# Patient Record
Sex: MALE | Race: WHITE | Employment: OTHER | URBAN - METROPOLITAN AREA
[De-identification: names, ages, dates, MRNs, and addresses within clinical notes are randomized per-mention and may not be internally consistent; named-entity substitution may affect disease eponyms.]

---

## 2018-04-09 ENCOUNTER — OFFICE VISIT (OUTPATIENT)
Dept: ONCOLOGY | Age: 78
End: 2018-04-09
Payer: MEDICARE

## 2018-04-09 ENCOUNTER — HOSPITAL ENCOUNTER (OUTPATIENT)
Dept: INFUSION THERAPY | Age: 78
Discharge: HOME | End: 2018-04-09
Payer: MEDICARE

## 2018-04-09 VITALS
DIASTOLIC BLOOD PRESSURE: 82 MMHG | HEART RATE: 66 BPM | TEMPERATURE: 98.2 F | RESPIRATION RATE: 16 BRPM | WEIGHT: 197 LBS | SYSTOLIC BLOOD PRESSURE: 145 MMHG | BODY MASS INDEX: 28.2 KG/M2 | HEIGHT: 70 IN | OXYGEN SATURATION: 96 %

## 2018-04-09 DIAGNOSIS — D64.9 ANEMIA, UNSPECIFIED TYPE: Primary | ICD-10-CM

## 2018-04-09 DIAGNOSIS — D64.9 ANEMIA, UNSPECIFIED TYPE: ICD-10-CM

## 2018-04-09 LAB
ALBUMIN SERPL-MCNC: 4.3 G/DL (ref 3.5–5.1)
ALP BLD-CCNC: 55 U/L (ref 38–126)
ALT SERPL-CCNC: 13 U/L (ref 11–66)
AST SERPL-CCNC: 17 U/L (ref 5–40)
BASINOPHIL, AUTOMATED: 0 % (ref 0–3)
BILIRUB SERPL-MCNC: 0.5 MG/DL (ref 0.3–1.2)
BILIRUBIN DIRECT: < 0.2 MG/DL (ref 0–0.3)
BUN, WHOLE BLOOD: 17 MG/DL (ref 8–26)
CHLORIDE, WHOLE BLOOD: 106 MEQ/L (ref 98–109)
CREATININE, WHOLE BLOOD: 0.8 MG/DL (ref 0.5–1.2)
EOSINOPHILS RELATIVE PERCENT: 3 % (ref 0–4)
FERRITIN: 12 NG/ML (ref 22–322)
FOLATE: 8.7 NG/ML (ref 4.8–24.2)
GFR, ESTIMATED: > 90 ML/MIN/1.73M2
GLUCOSE, WHOLE BLOOD: 98 MG/DL (ref 70–108)
HCT VFR BLD CALC: 30.4 % (ref 42–52)
HEMOGLOBIN: 10 GM/DL (ref 14–18)
IONIZED CALCIUM, WHOLE BLOOD: 1.22 MMOL/L (ref 1.12–1.32)
IRON SATURATION: 13 % (ref 20–50)
IRON: 51 UG/DL (ref 65–195)
LD: 152 U/L (ref 100–190)
LYMPHOCYTES # BLD: 32 % (ref 15–47)
MCH RBC QN AUTO: 28.7 PG (ref 27–31)
MCHC RBC AUTO-ENTMCNC: 32.8 GM/DL (ref 33–37)
MCV RBC AUTO: 87 FL (ref 80–94)
MONOCYTES: 6 % (ref 0–12)
PDW BLD-RTO: 13.8 % (ref 11.5–14.5)
PLATELET # BLD: 217 THOU/MM3 (ref 130–400)
PMV BLD AUTO: 6.9 FL (ref 7.4–10.4)
POTASSIUM, WHOLE BLOOD: 4.6 MEQ/L (ref 3.5–4.9)
RBC # BLD: 3.47 MILL/MM3 (ref 4.7–6.1)
SEG NEUTROPHILS: 59 % (ref 43–75)
SODIUM, WHOLE BLOOD: 141 MEQ/L (ref 138–146)
TOTAL CO2, WHOLE BLOOD: 24 MEQ/L (ref 23–33)
TOTAL IRON BINDING CAPACITY: 400 UG/DL (ref 171–450)
TOTAL PROTEIN: 8.6 G/DL (ref 6.1–8)
VITAMIN B-12: 1015 PG/ML (ref 211–911)
WBC # BLD: 4.2 THOU/MM3 (ref 4.8–10.8)

## 2018-04-09 PROCEDURE — 83540 ASSAY OF IRON: CPT

## 2018-04-09 PROCEDURE — 36415 COLL VENOUS BLD VENIPUNCTURE: CPT

## 2018-04-09 PROCEDURE — G8419 CALC BMI OUT NRM PARAM NOF/U: HCPCS | Performed by: INTERNAL MEDICINE

## 2018-04-09 PROCEDURE — 84160 ASSAY OF PROTEIN ANY SOURCE: CPT

## 2018-04-09 PROCEDURE — 82607 VITAMIN B-12: CPT

## 2018-04-09 PROCEDURE — 83615 LACTATE (LD) (LDH) ENZYME: CPT

## 2018-04-09 PROCEDURE — 99211 OFF/OP EST MAY X REQ PHY/QHP: CPT

## 2018-04-09 PROCEDURE — 82746 ASSAY OF FOLIC ACID SERUM: CPT

## 2018-04-09 PROCEDURE — 1123F ACP DISCUSS/DSCN MKR DOCD: CPT | Performed by: INTERNAL MEDICINE

## 2018-04-09 PROCEDURE — 1036F TOBACCO NON-USER: CPT | Performed by: INTERNAL MEDICINE

## 2018-04-09 PROCEDURE — 4040F PNEUMOC VAC/ADMIN/RCVD: CPT | Performed by: INTERNAL MEDICINE

## 2018-04-09 PROCEDURE — 84165 PROTEIN E-PHORESIS SERUM: CPT

## 2018-04-09 PROCEDURE — 82728 ASSAY OF FERRITIN: CPT

## 2018-04-09 PROCEDURE — 85025 COMPLETE CBC W/AUTO DIFF WBC: CPT

## 2018-04-09 PROCEDURE — 83550 IRON BINDING TEST: CPT

## 2018-04-09 PROCEDURE — 80076 HEPATIC FUNCTION PANEL: CPT

## 2018-04-09 PROCEDURE — 99203 OFFICE O/P NEW LOW 30 MIN: CPT | Performed by: INTERNAL MEDICINE

## 2018-04-09 PROCEDURE — 80047 BASIC METABLC PNL IONIZED CA: CPT

## 2018-04-09 PROCEDURE — G8428 CUR MEDS NOT DOCUMENT: HCPCS | Performed by: INTERNAL MEDICINE

## 2018-04-09 RX ORDER — LISINOPRIL 40 MG/1
TABLET ORAL
COMMUNITY
Start: 2018-04-02

## 2018-04-09 RX ORDER — SIMVASTATIN 40 MG
40 TABLET ORAL
COMMUNITY
Start: 2017-08-07

## 2018-04-09 RX ORDER — LEVOTHYROXINE SODIUM 0.07 MG/1
TABLET ORAL
COMMUNITY
Start: 2018-01-19

## 2018-04-09 RX ORDER — CYCLOBENZAPRINE HCL 10 MG
10 TABLET ORAL
COMMUNITY

## 2018-04-09 RX ORDER — PANTOPRAZOLE SODIUM 40 MG/1
40 TABLET, DELAYED RELEASE ORAL
COMMUNITY
Start: 2018-03-10 | End: 2018-04-26 | Stop reason: ALTCHOICE

## 2018-04-09 RX ORDER — ASPIRIN 81 MG/1
81 TABLET ORAL
COMMUNITY
Start: 2013-01-19

## 2018-04-09 RX ORDER — SUCRALFATE 1 G/1
1 TABLET ORAL
COMMUNITY
Start: 2018-03-10 | End: 2018-04-26 | Stop reason: ALTCHOICE

## 2018-04-09 RX ORDER — SOTALOL HYDROCHLORIDE 80 MG/1
TABLET ORAL
COMMUNITY
Start: 2018-03-09

## 2018-04-13 LAB — PROTEIN ELECTROPHORESIS, SERUM: NORMAL

## 2018-04-26 ENCOUNTER — OFFICE VISIT (OUTPATIENT)
Dept: ONCOLOGY | Age: 78
End: 2018-04-26
Payer: MEDICARE

## 2018-04-26 ENCOUNTER — HOSPITAL ENCOUNTER (OUTPATIENT)
Dept: INFUSION THERAPY | Age: 78
Discharge: HOME | End: 2018-04-26
Payer: MEDICARE

## 2018-04-26 VITALS
HEART RATE: 72 BPM | DIASTOLIC BLOOD PRESSURE: 85 MMHG | BODY MASS INDEX: 28.6 KG/M2 | OXYGEN SATURATION: 100 % | RESPIRATION RATE: 18 BRPM | HEIGHT: 70 IN | TEMPERATURE: 98.3 F | WEIGHT: 199.8 LBS | SYSTOLIC BLOOD PRESSURE: 140 MMHG

## 2018-04-26 DIAGNOSIS — D64.9 ANEMIA, UNSPECIFIED TYPE: ICD-10-CM

## 2018-04-26 DIAGNOSIS — D47.2 MGUS (MONOCLONAL GAMMOPATHY OF UNKNOWN SIGNIFICANCE): ICD-10-CM

## 2018-04-26 DIAGNOSIS — D50.0 IRON DEFICIENCY ANEMIA DUE TO CHRONIC BLOOD LOSS: ICD-10-CM

## 2018-04-26 DIAGNOSIS — D47.2 MGUS (MONOCLONAL GAMMOPATHY OF UNKNOWN SIGNIFICANCE): Primary | ICD-10-CM

## 2018-04-26 LAB — CALCIUM SERPL-MCNC: 9.1 MG/DL (ref 8.5–10.5)

## 2018-04-26 PROCEDURE — 36415 COLL VENOUS BLD VENIPUNCTURE: CPT

## 2018-04-26 PROCEDURE — 86335 IMMUNFIX E-PHORSIS/URINE/CSF: CPT

## 2018-04-26 PROCEDURE — G8419 CALC BMI OUT NRM PARAM NOF/U: HCPCS | Performed by: INTERNAL MEDICINE

## 2018-04-26 PROCEDURE — 99211 OFF/OP EST MAY X REQ PHY/QHP: CPT

## 2018-04-26 PROCEDURE — 99215 OFFICE O/P EST HI 40 MIN: CPT | Performed by: INTERNAL MEDICINE

## 2018-04-26 PROCEDURE — 1123F ACP DISCUSS/DSCN MKR DOCD: CPT | Performed by: INTERNAL MEDICINE

## 2018-04-26 PROCEDURE — 4040F PNEUMOC VAC/ADMIN/RCVD: CPT | Performed by: INTERNAL MEDICINE

## 2018-04-26 PROCEDURE — 84156 ASSAY OF PROTEIN URINE: CPT

## 2018-04-26 PROCEDURE — 1036F TOBACCO NON-USER: CPT | Performed by: INTERNAL MEDICINE

## 2018-04-26 PROCEDURE — 82310 ASSAY OF CALCIUM: CPT

## 2018-04-26 PROCEDURE — G8427 DOCREV CUR MEDS BY ELIG CLIN: HCPCS | Performed by: INTERNAL MEDICINE

## 2018-04-26 PROCEDURE — 83883 ASSAY NEPHELOMETRY NOT SPEC: CPT

## 2018-04-26 RX ORDER — FERROUS SULFATE 325(65) MG
325 TABLET ORAL
Qty: 30 TABLET | Refills: 3 | Status: SHIPPED | OUTPATIENT
Start: 2018-04-26

## 2018-04-30 LAB
KAPPA/LAMBDA FREE LIGHT CHAINS: NORMAL
PROTEIN ELECTROPHORESIS, URINE: NORMAL

## 2018-05-02 ASSESSMENT — ENCOUNTER SYMPTOMS
SORE THROAT: 0
BACK PAIN: 0
COUGH: 0
CONSTIPATION: 0
VOMITING: 0
WHEEZING: 0
BLOOD IN STOOL: 1
TROUBLE SWALLOWING: 0
DIARRHEA: 0
FACIAL SWELLING: 0
SHORTNESS OF BREATH: 1
RECTAL PAIN: 0
ABDOMINAL DISTENTION: 0
NAUSEA: 0
COLOR CHANGE: 0
ABDOMINAL PAIN: 0
CHEST TIGHTNESS: 0
EYE DISCHARGE: 0

## 2018-05-03 ENCOUNTER — HOSPITAL ENCOUNTER (OUTPATIENT)
Age: 78
Discharge: HOME | End: 2018-05-03
Payer: MEDICARE

## 2018-05-03 ENCOUNTER — HOSPITAL ENCOUNTER (OUTPATIENT)
Dept: GENERAL RADIOLOGY | Age: 78
Discharge: HOME | End: 2018-05-03
Payer: MEDICARE

## 2018-05-03 DIAGNOSIS — D47.2 MGUS (MONOCLONAL GAMMOPATHY OF UNKNOWN SIGNIFICANCE): ICD-10-CM

## 2018-05-03 PROCEDURE — 77075 RADEX OSSEOUS SURVEY COMPL: CPT

## 2018-05-22 ASSESSMENT — ENCOUNTER SYMPTOMS
ABDOMINAL DISTENTION: 0
NAUSEA: 0
VOMITING: 0
EYE DISCHARGE: 0
BLOOD IN STOOL: 1
CHEST TIGHTNESS: 0
RECTAL PAIN: 0
COUGH: 0
ABDOMINAL PAIN: 0
DIARRHEA: 0
SORE THROAT: 0
COLOR CHANGE: 0
TROUBLE SWALLOWING: 0
BACK PAIN: 0
SHORTNESS OF BREATH: 1
WHEEZING: 0
CONSTIPATION: 0
FACIAL SWELLING: 0

## 2018-06-20 ENCOUNTER — TELEPHONE (OUTPATIENT)
Dept: ONCOLOGY | Age: 78
End: 2018-06-20

## 2018-07-25 DIAGNOSIS — D50.9 IRON DEFICIENCY ANEMIA, UNSPECIFIED IRON DEFICIENCY ANEMIA TYPE: Primary | ICD-10-CM

## 2018-07-26 ENCOUNTER — OFFICE VISIT (OUTPATIENT)
Dept: ONCOLOGY | Age: 78
End: 2018-07-26
Payer: MEDICARE

## 2018-07-26 ENCOUNTER — HOSPITAL ENCOUNTER (OUTPATIENT)
Dept: INFUSION THERAPY | Age: 78
Discharge: HOME | End: 2018-07-26
Payer: MEDICARE

## 2018-07-26 VITALS
HEIGHT: 70 IN | DIASTOLIC BLOOD PRESSURE: 71 MMHG | RESPIRATION RATE: 18 BRPM | SYSTOLIC BLOOD PRESSURE: 121 MMHG | BODY MASS INDEX: 28.35 KG/M2 | OXYGEN SATURATION: 97 % | HEART RATE: 90 BPM | WEIGHT: 198 LBS | TEMPERATURE: 97.1 F

## 2018-07-26 DIAGNOSIS — D50.9 IRON DEFICIENCY ANEMIA, UNSPECIFIED IRON DEFICIENCY ANEMIA TYPE: ICD-10-CM

## 2018-07-26 DIAGNOSIS — D47.2 MGUS (MONOCLONAL GAMMOPATHY OF UNKNOWN SIGNIFICANCE): Primary | ICD-10-CM

## 2018-07-26 DIAGNOSIS — D64.9 ANEMIA, UNSPECIFIED TYPE: ICD-10-CM

## 2018-07-26 LAB
BASINOPHIL, AUTOMATED: 0 % (ref 0–3)
BUN, WHOLE BLOOD: 13 MG/DL (ref 8–26)
CHLORIDE, WHOLE BLOOD: 105 MEQ/L (ref 98–109)
CREATININE, WHOLE BLOOD: 0.8 MG/DL (ref 0.5–1.2)
EOSINOPHILS RELATIVE PERCENT: 4 % (ref 0–4)
FERRITIN: 25 NG/ML (ref 22–322)
GFR, ESTIMATED: > 90 ML/MIN/1.73M2
GLUCOSE, WHOLE BLOOD: 76 MG/DL (ref 70–108)
HCT VFR BLD CALC: 30.6 % (ref 42–52)
HEMOGLOBIN: 10.4 GM/DL (ref 14–18)
IONIZED CALCIUM, WHOLE BLOOD: 1.21 MMOL/L (ref 1.12–1.32)
IRON: 183 UG/DL (ref 65–195)
LYMPHOCYTES # BLD: 34 % (ref 15–47)
MCH RBC QN AUTO: 33.7 PG (ref 27–31)
MCHC RBC AUTO-ENTMCNC: 34 GM/DL (ref 33–37)
MCV RBC AUTO: 99 FL (ref 80–94)
MONOCYTES: 11 % (ref 0–12)
PDW BLD-RTO: 13.8 % (ref 11.5–14.5)
PLATELET # BLD: 188 THOU/MM3 (ref 130–400)
PMV BLD AUTO: 6.8 FL (ref 7.4–10.4)
POTASSIUM, WHOLE BLOOD: 4.1 MEQ/L (ref 3.5–4.9)
RBC # BLD: 3.09 MILL/MM3 (ref 4.7–6.1)
SEG NEUTROPHILS: 52 % (ref 43–75)
SODIUM, WHOLE BLOOD: 142 MEQ/L (ref 138–146)
TOTAL CO2, WHOLE BLOOD: 24 MEQ/L (ref 23–33)
WBC # BLD: 3.5 THOU/MM3 (ref 4.8–10.8)

## 2018-07-26 PROCEDURE — 4040F PNEUMOC VAC/ADMIN/RCVD: CPT | Performed by: INTERNAL MEDICINE

## 2018-07-26 PROCEDURE — G8427 DOCREV CUR MEDS BY ELIG CLIN: HCPCS | Performed by: INTERNAL MEDICINE

## 2018-07-26 PROCEDURE — 82728 ASSAY OF FERRITIN: CPT

## 2018-07-26 PROCEDURE — G8419 CALC BMI OUT NRM PARAM NOF/U: HCPCS | Performed by: INTERNAL MEDICINE

## 2018-07-26 PROCEDURE — 85025 COMPLETE CBC W/AUTO DIFF WBC: CPT

## 2018-07-26 PROCEDURE — 80047 BASIC METABLC PNL IONIZED CA: CPT

## 2018-07-26 PROCEDURE — 1036F TOBACCO NON-USER: CPT | Performed by: INTERNAL MEDICINE

## 2018-07-26 PROCEDURE — 83540 ASSAY OF IRON: CPT

## 2018-07-26 PROCEDURE — 99213 OFFICE O/P EST LOW 20 MIN: CPT | Performed by: INTERNAL MEDICINE

## 2018-07-26 PROCEDURE — 99211 OFF/OP EST MAY X REQ PHY/QHP: CPT

## 2018-07-26 PROCEDURE — 1123F ACP DISCUSS/DSCN MKR DOCD: CPT | Performed by: INTERNAL MEDICINE

## 2018-07-26 PROCEDURE — 1101F PT FALLS ASSESS-DOCD LE1/YR: CPT | Performed by: INTERNAL MEDICINE

## 2018-07-26 PROCEDURE — 86334 IMMUNOFIX E-PHORESIS SERUM: CPT

## 2018-07-26 PROCEDURE — 36415 COLL VENOUS BLD VENIPUNCTURE: CPT

## 2018-07-26 ASSESSMENT — ENCOUNTER SYMPTOMS
ABDOMINAL DISTENTION: 0
COLOR CHANGE: 0
DIARRHEA: 0
ABDOMINAL PAIN: 0
WHEEZING: 0
BACK PAIN: 0
FACIAL SWELLING: 0
BLOOD IN STOOL: 1
NAUSEA: 0
VOMITING: 0
CONSTIPATION: 0
SHORTNESS OF BREATH: 1
EYE DISCHARGE: 0
CHEST TIGHTNESS: 0
SORE THROAT: 0
TROUBLE SWALLOWING: 0
RECTAL PAIN: 0
COUGH: 0

## 2018-07-26 NOTE — PROGRESS NOTES
01/14/2013    PACEMAKER PLACEMENT  07/2017      Family History   Problem Relation Age of Onset    Other Mother         BLEEDING ULCER    Heart Attack Brother       Social History   Substance Use Topics    Smoking status: Never Smoker    Smokeless tobacco: Never Used    Alcohol use Yes      Comment: SOCIAL      Current Outpatient Prescriptions   Medication Sig Dispense Refill    ferrous sulfate (ISELA-JACK) 325 (65 Fe) MG tablet Take 1 tablet by mouth daily (with breakfast) 30 tablet 3    aspirin EC 81 MG EC tablet Take 81 mg by mouth      cyclobenzaprine (FLEXERIL) 10 MG tablet Take 10 mg by mouth      levothyroxine (SYNTHROID) 75 MCG tablet       lisinopril (PRINIVIL;ZESTRIL) 40 MG tablet       simvastatin (ZOCOR) 40 MG tablet Take 40 mg by mouth      sotalol (BETAPACE) 80 MG tablet       Cyanocobalamin ER 1000 MCG TBCR Take 1,000 mcg by mouth      Multiple Vitamins-Minerals (RA VISION-ALEJANDRA PO) Take by mouth daily       No current facility-administered medications for this visit. No Known Allergies   Health Maintenance   Topic Date Due    DTaP/Tdap/Td vaccine (1 - Tdap) [birth year]/1958    Shingles Vaccine (1 of 2 - 2 Dose Series) 02/15/1989    Pneumococcal low/med risk (2 of 2 - PPSV23) 07/03/2017    Flu vaccine (1) 09/01/2018    Potassium monitoring  03/07/2019    Creatinine monitoring  03/07/2019        Subjective:   Review of Systems   Constitutional: Positive for fatigue. Negative for activity change, appetite change and fever. HENT: Negative for congestion, dental problem, facial swelling, hearing loss, mouth sores, nosebleeds, sore throat, tinnitus and trouble swallowing. Eyes: Negative for discharge and visual disturbance. Respiratory: Positive for shortness of breath. Negative for cough, chest tightness and wheezing. Cardiovascular: Negative for chest pain, palpitations and leg swelling. Gastrointestinal: Positive for blood in stool.  Negative for abdominal distention, nerve deficit. He exhibits normal muscle tone. Skin: Skin is warm. No rash noted. No erythema. Psychiatric: He has a normal mood and affect. His behavior is normal. Judgment and thought content normal.   Vitals reviewed. /71 (Site: Left Arm, Position: Sitting, Cuff Size: Medium Adult)   Pulse 90   Temp 97.1 °F (36.2 °C) (Oral)   Resp 18   Ht 5' 10\" (1.778 m)   Wt 198 lb (89.8 kg)   SpO2 97%   BMI 28.41 kg/m²      Imaging studies and labs:   No results found. Lab Results   Component Value Date    WBC 3.5 (L) 07/26/2018    HGB 10.4 (L) 07/26/2018    HCT 30.6 (L) 07/26/2018    MCV 99 (H) 07/26/2018     07/26/2018       Chemistry        Component Value Date/Time     07/26/2018 1319    K 4.1 07/26/2018 1319    CREATININE 0.8 07/26/2018 1319        Component Value Date/Time    CALCIUM 9.1 04/26/2018 1257    ALKPHOS 55 04/09/2018 1543    AST 17 04/09/2018 1543    ALT 13 04/09/2018 1543    BILITOT 0.5 04/09/2018 1543        1. Anemia. Assessment:        Diagnosis Orders   1. MGUS (monoclonal gammopathy of unknown significance)     2. Anemia, unspecified type        1. Anemia. Due to normocytic anemia the patient had further workup. It revealed an iron deficiency anemia with a ferritin at 12, iron 51, iron saturation 13 and TIBC 400. The patient is instructed to take oral iron supplementation. Today his hemoglobin is improved to 10.4. His ferritin is 25 and iron is improved to 183. His folic acid and vitamin B12 level were within normal limits in April 2018. 2. MGUS. His serum electrophoresis showed M spike in the gamma region. The monoclonal protein peak accounted for 2 g/dL. [place]/Lambda Free Light Chain Ratio  was elevated at  14.23. I had a lengthy discussion with the patient and his son about his new diagnosis, prognosis of MGUS and further management. Patients with MGUS progress to more advanced disease at a rate of 1% per year.  The median survival of patients with MGUS

## 2018-07-30 LAB — IMMUNOFIXATION RESULT, SERUM: NORMAL

## 2018-10-25 DIAGNOSIS — D47.2 MGUS (MONOCLONAL GAMMOPATHY OF UNKNOWN SIGNIFICANCE): ICD-10-CM

## 2018-10-26 ENCOUNTER — OFFICE VISIT (OUTPATIENT)
Dept: ONCOLOGY | Age: 78
End: 2018-10-26
Payer: MEDICARE

## 2018-10-26 ENCOUNTER — HOSPITAL ENCOUNTER (OUTPATIENT)
Dept: INFUSION THERAPY | Age: 78
Discharge: HOME | End: 2018-10-26
Payer: MEDICARE

## 2018-10-26 VITALS
TEMPERATURE: 97.1 F | SYSTOLIC BLOOD PRESSURE: 147 MMHG | WEIGHT: 197 LBS | BODY MASS INDEX: 28.2 KG/M2 | HEIGHT: 70 IN | HEART RATE: 65 BPM | DIASTOLIC BLOOD PRESSURE: 87 MMHG | OXYGEN SATURATION: 97 % | RESPIRATION RATE: 16 BRPM

## 2018-10-26 DIAGNOSIS — D47.2 MGUS (MONOCLONAL GAMMOPATHY OF UNKNOWN SIGNIFICANCE): ICD-10-CM

## 2018-10-26 DIAGNOSIS — D50.9 IRON DEFICIENCY ANEMIA, UNSPECIFIED IRON DEFICIENCY ANEMIA TYPE: ICD-10-CM

## 2018-10-26 DIAGNOSIS — D47.2 MGUS (MONOCLONAL GAMMOPATHY OF UNKNOWN SIGNIFICANCE): Primary | ICD-10-CM

## 2018-10-26 LAB
BASINOPHIL, AUTOMATED: 0 % (ref 0–3)
BUN, WHOLE BLOOD: 12 MG/DL (ref 8–26)
CALCIUM SERPL-MCNC: 9.4 MG/DL (ref 8.5–10.5)
CHLORIDE, WHOLE BLOOD: 105 MEQ/L (ref 98–109)
CREATININE, WHOLE BLOOD: 0.7 MG/DL (ref 0.5–1.2)
EOSINOPHILS RELATIVE PERCENT: 4 % (ref 0–4)
GFR, ESTIMATED: > 90 ML/MIN/1.73M2
GLUCOSE, WHOLE BLOOD: 106 MG/DL (ref 70–108)
HCT VFR BLD CALC: 37.6 % (ref 42–52)
HEMOGLOBIN: 12.9 GM/DL (ref 14–18)
IONIZED CALCIUM, WHOLE BLOOD: 1.19 MMOL/L (ref 1.12–1.32)
LYMPHOCYTES # BLD: 32 % (ref 15–47)
MCH RBC QN AUTO: 34.2 PG (ref 27–31)
MCHC RBC AUTO-ENTMCNC: 34.4 GM/DL (ref 33–37)
MCV RBC AUTO: 99 FL (ref 80–94)
MONOCYTES: 9 % (ref 0–12)
PDW BLD-RTO: 12.2 % (ref 11.5–14.5)
PLATELET # BLD: 169 THOU/MM3 (ref 130–400)
PMV BLD AUTO: 6.5 FL (ref 7.4–10.4)
POTASSIUM, WHOLE BLOOD: 4.2 MEQ/L (ref 3.5–4.9)
RBC # BLD: 3.79 MILL/MM3 (ref 4.7–6.1)
SEG NEUTROPHILS: 55 % (ref 43–75)
SODIUM, WHOLE BLOOD: 142 MEQ/L (ref 138–146)
TOTAL CO2, WHOLE BLOOD: 25 MEQ/L (ref 23–33)
WBC # BLD: 3.8 THOU/MM3 (ref 4.8–10.8)

## 2018-10-26 PROCEDURE — 99211 OFF/OP EST MAY X REQ PHY/QHP: CPT

## 2018-10-26 PROCEDURE — 1101F PT FALLS ASSESS-DOCD LE1/YR: CPT | Performed by: INTERNAL MEDICINE

## 2018-10-26 PROCEDURE — 84160 ASSAY OF PROTEIN ANY SOURCE: CPT

## 2018-10-26 PROCEDURE — 36415 COLL VENOUS BLD VENIPUNCTURE: CPT

## 2018-10-26 PROCEDURE — 1036F TOBACCO NON-USER: CPT | Performed by: INTERNAL MEDICINE

## 2018-10-26 PROCEDURE — 85025 COMPLETE CBC W/AUTO DIFF WBC: CPT

## 2018-10-26 PROCEDURE — 99214 OFFICE O/P EST MOD 30 MIN: CPT | Performed by: INTERNAL MEDICINE

## 2018-10-26 PROCEDURE — 83883 ASSAY NEPHELOMETRY NOT SPEC: CPT

## 2018-10-26 PROCEDURE — 82310 ASSAY OF CALCIUM: CPT

## 2018-10-26 PROCEDURE — G8419 CALC BMI OUT NRM PARAM NOF/U: HCPCS | Performed by: INTERNAL MEDICINE

## 2018-10-26 PROCEDURE — 86334 IMMUNOFIX E-PHORESIS SERUM: CPT

## 2018-10-26 PROCEDURE — 1123F ACP DISCUSS/DSCN MKR DOCD: CPT | Performed by: INTERNAL MEDICINE

## 2018-10-26 PROCEDURE — 82784 ASSAY IGA/IGD/IGG/IGM EACH: CPT

## 2018-10-26 PROCEDURE — 84165 PROTEIN E-PHORESIS SERUM: CPT

## 2018-10-26 PROCEDURE — 80047 BASIC METABLC PNL IONIZED CA: CPT

## 2018-10-26 PROCEDURE — G8427 DOCREV CUR MEDS BY ELIG CLIN: HCPCS | Performed by: INTERNAL MEDICINE

## 2018-10-26 PROCEDURE — G8484 FLU IMMUNIZE NO ADMIN: HCPCS | Performed by: INTERNAL MEDICINE

## 2018-10-26 PROCEDURE — 4040F PNEUMOC VAC/ADMIN/RCVD: CPT | Performed by: INTERNAL MEDICINE

## 2018-10-26 RX ORDER — UBIDECARENONE 100 MG
200 CAPSULE ORAL DAILY
COMMUNITY

## 2018-10-27 ASSESSMENT — ENCOUNTER SYMPTOMS
COLOR CHANGE: 0
WHEEZING: 0
EYE DISCHARGE: 0
FACIAL SWELLING: 0
NAUSEA: 0
ABDOMINAL PAIN: 0
DIARRHEA: 0
VOMITING: 0
TROUBLE SWALLOWING: 0
CHEST TIGHTNESS: 0
CONSTIPATION: 0
ABDOMINAL DISTENTION: 0
BLOOD IN STOOL: 0
SORE THROAT: 0
RECTAL PAIN: 0
SHORTNESS OF BREATH: 0
BACK PAIN: 0
COUGH: 0

## 2018-10-28 LAB — KAPPA/LAMBDA FREE LIGHT CHAINS: NORMAL

## 2018-10-28 NOTE — PROGRESS NOTES
[facility] PROFESSIONAL SERVS  [facility]  [address]  [address]  [address] [address]  Dept: [phone]  Dept Fax: [phone]: [phone]     Visit Date: 10/26/2018     Abhay Gutierrez is a 77 y.o. male who presents today for:   Chief Complaint   Patient presents with    Follow-up     Anemia        HPI:   This is a 78-year-old male referred to the medical oncology/hematology clinic for further workup of microcytic anemia. The patient was admitted to [facility] on 3/7/18 for symptomatic anemia. His hemoglobin was 6.8 and the patient was short of breath and reported generalized weakness. He also reported having dark stools but no fresh blood. He had similar episodes in  January 2018 when he required 4 units of PRBCs. In January 2018 he underwent  esophagogastroduodenoscopy and colonoscopy. Esophagogastroduodenoscopy revealed gastric ulcers. The patient was placed on PPI and Carafate. He was also instructed to stop taking Coumadin. His colonoscopy was unremarkable. However in March 2018 he ran out of PPI and Carafate and presented with new episodes of GI bleeding. He was given 3 units of Blood and coumadin was stopped . He also underwent capsule endoscopy that was unremarkable. As the part of workup for the anemia the patient had serum immunoelectrophoresis that showed M protein. The monoclonal protein peak accounted for 2.01 g/dL of the total 2.13 g/dL of protein in the gamma region. The patient had bone survey study in May 2018 that was negative for evidence of lytic or sclerotic lesion. His calcium, creatinine and hemoglobin were within normal limits. As such the patient was diagnosed with MGUS    Interim history on October 26, 2018: The patient presents to the medical oncology clinic for 3 months follow-up and check his CBC and serum immunoelectrophoresis. He denies having any new complaints since last visit with me. He denies having any active bleeding.   He

## 2018-10-29 LAB — IMMUNOFIXATION ELECTROPHORESIS: NORMAL

## 2018-10-30 NOTE — COMMUNICATION BODY
Assessment:       Plan:       Diagnosis Orders   1. MGUS (monoclonal gammopathy of unknown significance)     2. Iron deficiency anemia, unspecified iron deficiency anemia type        1. Anemia. Due to normocytic anemia the patient had further workup. It revealed an iron deficiency anemia with low ferritin at 12, iron 51, iron saturation 13 and TIBC 400. The patient is currently on oral iron supplementation. Today his hemoglobin is improved to 12.9, MCV is 99. He was instructed to continue oral iron which he tolerates well. 2. MGUS. His serum electrophoresis showed M spike in the gamma region in April 2018. The monoclonal protein peak accounted for 2 g/dL. [place]/Lambda Free Light Chain Ratio  was elevated at  14.23. We repeated Serum immunofixation electrophoresis in October 2018 and his M protein and light chain are stable. The patient denies having any bone pain. The diagnosis of monoclonal gammopathy of undetermined significance (MGUS) is usually incidental when a monoclonal protein <3 g/dL is found as part of the evaluation of another disorder, such as unexplained age-inappropriate bone loss, unexplained proteinuria, an elevated total protein in the blood, or peripheral neuropathy without a defined etiology. The patient's hemoglobin is improving. His calcium and creatinine are within normal limits. Today's serum protein electrophoresis and immunofixation  ? Urine protein electrophoresis and immunofixation - The serum free light chain (FLC) assay can be used initially in place of urine studies. However, if a monoclonal protein is seen on serum studies or if the serum FLC ratio is abnormal, urine electrophoresis and immunofixation need to be performed. ? Serum FLC assay  ? Quantitation of immunoglobulins  ? Metastatic bone survey - negative  . Patients with MGUS progress to more advanced disease at a rate of 1% per year.  The median survival of patients with MGUS is only slightly shorter than that of

## 2019-04-26 ENCOUNTER — TELEPHONE (OUTPATIENT)
Dept: ONCOLOGY | Age: 79
End: 2019-04-26

## 2019-05-09 LAB
ALBUMIN: 3.8
SEDIMENTATION RATE, ERYTHROCYTE: 12